# Patient Record
Sex: FEMALE | HISPANIC OR LATINO | RURAL
[De-identification: names, ages, dates, MRNs, and addresses within clinical notes are randomized per-mention and may not be internally consistent; named-entity substitution may affect disease eponyms.]

---

## 2019-07-29 ENCOUNTER — APPOINTMENT (RX ONLY)
Dept: RURAL CLINIC 1 | Facility: CLINIC | Age: 59
Setting detail: DERMATOLOGY
End: 2019-07-29

## 2019-07-29 DIAGNOSIS — L40.3 PUSTULOSIS PALMARIS ET PLANTARIS: ICD-10-CM

## 2019-07-29 PROCEDURE — 99202 OFFICE O/P NEW SF 15 MIN: CPT

## 2019-07-29 PROCEDURE — ? TREATMENT REGIMEN

## 2019-07-29 PROCEDURE — ? PRESCRIPTION

## 2019-07-29 PROCEDURE — ? COUNSELING

## 2019-07-29 RX ORDER — CLOBETASOL PROPIONATE 0.5 MG/G
OINTMENT TOPICAL
Qty: 1 | Refills: 2 | Status: ERX | COMMUNITY
Start: 2019-07-29

## 2019-07-29 RX ADMIN — CLOBETASOL PROPIONATE: 0.5 OINTMENT TOPICAL at 14:06

## 2019-07-29 ASSESSMENT — LOCATION DETAILED DESCRIPTION DERM
LOCATION DETAILED: RIGHT RADIAL PALM
LOCATION DETAILED: LEFT MEDIAL PLANTAR MIDFOOT
LOCATION DETAILED: RIGHT MEDIAL PLANTAR MIDFOOT
LOCATION DETAILED: LEFT THENAR EMINENCE

## 2019-07-29 ASSESSMENT — PGA PSORIASIS: PGA PSORIASIS: MODERATE (MODERATE PLAQUE ELEVATION, MODERATE ERYTHEMA, COARSE SCALE PREDOMINATES)

## 2019-07-29 ASSESSMENT — PAIN INTENSITY VAS: HOW INTENSE IS YOUR PAIN 0 BEING NO PAIN, 10 BEING THE MOST SEVERE PAIN POSSIBLE?: NO PAIN

## 2019-07-29 ASSESSMENT — LOCATION ZONE DERM
LOCATION ZONE: FEET
LOCATION ZONE: HAND

## 2019-07-29 ASSESSMENT — LOCATION SIMPLE DESCRIPTION DERM
LOCATION SIMPLE: LEFT HAND
LOCATION SIMPLE: RIGHT HAND
LOCATION SIMPLE: RIGHT PLANTAR SURFACE
LOCATION SIMPLE: LEFT PLANTAR SURFACE

## 2019-07-29 ASSESSMENT — BSA RASH: BSA RASH: 5

## 2019-07-29 NOTE — PROCEDURE: MIPS QUALITY
Quality 111:Pneumonia Vaccination Status For Older Adults: Pneumococcal Vaccination not Administered or Previously Received, Reason not Otherwise Specified
Quality 226: Preventive Care And Screening: Tobacco Use: Screening And Cessation Intervention: Patient screened for tobacco use and is an ex/non-smoker
Quality 131: Pain Assessment And Follow-Up: Pain assessment documented as positive using a standardized tool AND a follow-up plan is documented
Detail Level: Detailed
Quality 130: Documentation Of Current Medications In The Medical Record: Current Medications Documented
Quality 110: Preventive Care And Screening: Influenza Immunization: Influenza immunization was not ordered or administered, reason not given

## 2019-07-29 NOTE — PROCEDURE: TREATMENT REGIMEN
Action 2: Continue
Detail Level: Zone
Start Regimen: Clobetasol Ointment BID as needed. May occlude with vinyl gloves or Saran Wrap.
Other Instructions: Patient is not interested in MTX or biologics at this time.

## 2019-09-09 ENCOUNTER — APPOINTMENT (RX ONLY)
Dept: RURAL CLINIC 1 | Facility: CLINIC | Age: 59
Setting detail: DERMATOLOGY
End: 2019-09-09

## 2019-09-09 DIAGNOSIS — L40.3 PUSTULOSIS PALMARIS ET PLANTARIS: ICD-10-CM | Status: IMPROVED

## 2019-09-09 PROBLEM — L30.9 DERMATITIS, UNSPECIFIED: Status: ACTIVE | Noted: 2019-09-09

## 2019-09-09 PROCEDURE — 99212 OFFICE O/P EST SF 10 MIN: CPT

## 2019-09-09 PROCEDURE — ? OTHER

## 2019-09-09 ASSESSMENT — PAIN INTENSITY VAS: HOW INTENSE IS YOUR PAIN 0 BEING NO PAIN, 10 BEING THE MOST SEVERE PAIN POSSIBLE?: NO PAIN

## 2019-09-09 ASSESSMENT — PGA PSORIASIS: PGA PSORIASIS: MILD (MILD PLAQUE ELEVATION, LIGHT ERYTHEMA, FINE SCALE PREDOMINATES)

## 2019-09-09 NOTE — PROCEDURE: MIPS QUALITY
Quality 131: Pain Assessment And Follow-Up: Pain assessment documented as positive using a standardized tool AND a follow-up plan is documented
Quality 226: Preventive Care And Screening: Tobacco Use: Screening And Cessation Intervention: Patient screened for tobacco use and is an ex/non-smoker
Quality 130: Documentation Of Current Medications In The Medical Record: Current Medications Documented
Quality 111:Pneumonia Vaccination Status For Older Adults: Pneumococcal Vaccination not Administered or Previously Received, Reason not Otherwise Specified
Detail Level: Detailed
Quality 110: Preventive Care And Screening: Influenza Immunization: Influenza immunization was not ordered or administered, reason not given

## 2019-09-09 NOTE — PROCEDURE: OTHER
Detail Level: Zone
Other (Free Text): Doing well with current plan\\nCont topical steroids
Note Text (......Xxx Chief Complaint.): This diagnosis correlates with the